# Patient Record
Sex: FEMALE | Race: WHITE | NOT HISPANIC OR LATINO | Employment: OTHER | URBAN - METROPOLITAN AREA
[De-identification: names, ages, dates, MRNs, and addresses within clinical notes are randomized per-mention and may not be internally consistent; named-entity substitution may affect disease eponyms.]

---

## 2017-01-19 ENCOUNTER — OFFICE VISIT (OUTPATIENT)
Dept: NEUROLOGY | Facility: CLINIC | Age: 22
End: 2017-01-19

## 2017-01-19 VITALS
DIASTOLIC BLOOD PRESSURE: 78 MMHG | OXYGEN SATURATION: 96 % | SYSTOLIC BLOOD PRESSURE: 110 MMHG | HEIGHT: 63 IN | HEART RATE: 116 BPM

## 2017-01-19 DIAGNOSIS — R90.89 ABNORMAL FINDING ON MRI OF BRAIN: ICD-10-CM

## 2017-01-19 DIAGNOSIS — Z87.74 PERSONAL HISTORY OF ARTERIOVENOUS MALFORMATION (AVM): ICD-10-CM

## 2017-01-19 DIAGNOSIS — G43.009 MIGRAINE WITHOUT AURA AND RESPONSIVE TO TREATMENT: Primary | ICD-10-CM

## 2017-01-19 DIAGNOSIS — G81.90 HEMIPARESIS (HCC): ICD-10-CM

## 2017-01-19 DIAGNOSIS — Z86.73 HISTORY OF CVA (CEREBROVASCULAR ACCIDENT): ICD-10-CM

## 2017-01-19 PROCEDURE — 99213 OFFICE O/P EST LOW 20 MIN: CPT | Performed by: NURSE PRACTITIONER

## 2017-01-19 RX ORDER — HYDROXYZINE HYDROCHLORIDE 25 MG/1
25 TABLET, FILM COATED ORAL NIGHTLY
COMMUNITY

## 2017-01-19 NOTE — Clinical Note
January 19, 2017     Amadou Mason MD  [address]  [place]  [facility] [address]    Patient: Bonita Yang   YOB: 1995   Date of Visit: 1/19/2017       Dear Dr. Marlon MD:    Thank you for referring Bonita Yang to me for evaluation. Below are the relevant portions of my assessment and plan of care.       Bonita was seen today for difficulty walking and migraine.    Diagnoses and all orders for this visit:    Migraine without aura and responsive to treatment    Hemiparesis  -     MRI Brain With & Without Contrast  -     MRI Angiogram Head Without Contrast    Personal history of arteriovenous malformation (AVM)  -     MRI Brain With & Without Contrast  -     MRI Angiogram Head Without Contrast    History of CVA (cerebrovascular accident)  -     MRI Brain With & Without Contrast  -     MRI Angiogram Head Without Contrast    Abnormal finding on MRI of brain  -     MRI Brain With & Without Contrast  -     MRI Angiogram Head Without Contrast               If you have questions, please do not hesitate to call me. I look forward to following Bonita along with you.         Sincerely,        NOHEMY Hein        CC: No Recipients

## 2017-01-19 NOTE — PROGRESS NOTES
"Subjective:     Patient ID: Bonita Yang is a 21 y.o. female.    History of Present Illness   Here for 6 month follow up on migraines. Since last visit she weaned off of Topamax and she has not had any migraines since then. She has 2-3 very minor tension headaches per week but rarely takes Ibuprofen for these. She is on Propranolol 20mg TID for headache prevention, anxiety and HTN and has tolerated this well. She is accompanied by her nurse today from [facility] where she lives and she reports she will be moving to [place], [place] in a few weeks to a new facility. She is due for repeat MRI Brain with and without contrast and MRA brain without contrast for annual screening per Neurology Dr. Wiseman's recommendations with her significant history of CVA and AVM. She is doing really well overall. Prefers to be called \"Bridgett\".    Significant history: had MRI brain with and without contrast which shows old right frontal and right temporal lobe infarct. no abnormal enhancement. MRA brain showed mild narrowing of m1-m2 portions of right MCA; both were done on 8/19/15. She has a history of AVM diagnosed at age 4. She did have Right Thalamic Stroke secondary to AVM September 17, 2012 and has left hemiparesis and left sided neglect since then. 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 She was diagnosed with AVM in the past and they did embolization and then removal in 2013. 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  The following portions of the patient's history were reviewed and updated as appropriate: allergies, current medications, past family history, past medical history, past social history, past surgical history and problem list.    Review of Systems "   Constitutional: Positive for fatigue. Negative for chills, fever and unexpected weight change.   HENT: Negative for ear pain, hearing loss, nosebleeds, rhinorrhea and sore throat.    Eyes: Positive for visual disturbance. Negative for photophobia, pain, discharge and itching.   Respiratory: Positive for cough. Negative for chest tightness, shortness of breath and wheezing.    Cardiovascular: Negative for chest pain, palpitations and leg swelling.        HEART RATE IS FAST   Gastrointestinal: Positive for constipation. Negative for abdominal pain, blood in stool, diarrhea, nausea and vomiting.   Endocrine: Positive for heat intolerance.        MUSCLE WEAKNESS   Genitourinary: Negative for dysuria, frequency, hematuria and urgency.   Musculoskeletal: Negative for arthralgias, back pain, gait problem, joint swelling, myalgias, neck pain and neck stiffness.        JOINT STIFFNESS   Skin: Positive for rash. Negative for wound.   Allergic/Immunologic: Negative for environmental allergies and food allergies.   Neurological: Positive for weakness (LIMB). Negative for dizziness, tremors, seizures, syncope, speech difficulty, light-headedness, numbness and headaches.        DIFFICULTY WALKING   Hematological: Negative for adenopathy. Does not bruise/bleed easily.   Psychiatric/Behavioral: Positive for dysphoric mood. Negative for agitation, confusion, decreased concentration, hallucinations, sleep disturbance and suicidal ideas. The patient is nervous/anxious.         Objective:    Neurologic Exam   Mental Status   Oriented to person, place, and time.   Attention: normal. Concentration: normal.   Speech: speech is normal   Level of consciousness: alert     Cranial Nerves      CN II   Visual fields full to confrontation.      CN III, IV, VI   Extraocular motions are normal.   Pupils: unequal  Right pupil: Size: 4 mm. Shape: regular. Reactivity: brisk.   Left pupil: Size: 6 mm. Shape: regular. Reactivity: brisk.      CN V    Facial sensation intact.      CN VII   Right facial weakness: none  Left facial weakness: central     CN VIII   CN VIII normal.      CN IX, X   CN IX normal.   CN X normal.      CN XI   Right sternocleidomastoid strength: normal  Left sternocleidomastoid strength: weak  Right trapezius strength: normal  Left trapezius strength: weak     CN XII   CN XII normal.      Motor Exam   Muscle bulk: decreased (LUE/LLE)  Right arm tone: normal  Left arm tone: spastic  Right leg tone: normal  Left leg tone: decreased     Strength   Strength 5/5 except as noted.   Left deltoid: 2/5  Left biceps: 2/5  Left triceps: 2/5  Left wrist flexion: 1/5  Left wrist extension: 1/5  Left quadriceps: 0/5  Left hamstrin/5  Left anterior tibial: 0/5  Left posterior tibial: 0/5  Left Hemiparesis      Sensory Exam   Light touch normal.   Vibration normal.   Proprioception normal.   Pinprick normal.      Gait, Coordination, and Reflexes      Gait  Gait: (wheelchair)     Coordination   Romberg: negative  Finger to nose coordination: abnormal (on Left)     Tremor   Resting tremor: absent  Intention tremor: absent  Action tremor: absent     Reflexes   Right brachioradialis: 4+  Left brachioradialis: 4+  Right biceps: 4+  Left biceps: 4+  Right triceps: 4+  Left triceps: 4+  Right patellar: 4+  Left patellar: 4+  Right achilles: 4+  Left achilles: 4+  Right : 2+  Left : 0  Right plantar: normal  Left plantar: normal  Right Redman: absent  Left Redman: absent  Right ankle clonus: absent  Left ankle clonus: absent     Physical Exam  Constitutional: She is oriented to person, place, and time.   Eyes: EOM are normal. Pupils are unequal.   Neurological: She is oriented to person, place, and time. She has an abnormal Finger-Nose-Finger Test (on Left). She has a normal Romberg Test.   Reflex Scores:  Tricep reflexes are 4+ on the right side and 4+ on the left side.  Bicep reflexes are 4+ on the right side and 4+ on the left  side.  Brachioradialis reflexes are 4+ on the right side and 4+ on the left side.  Patellar reflexes are 4+ on the right side and 4+ on the left side.  Achilles reflexes are 4+ on the right side and 4+ on the left side.  Psychiatric: Her speech is normal. Thought content normal. Her affect is blunt. She is hyperactive. She expresses impulsivity. She exhibits abnormal recent memory.   Assessment/Plan:     Bonita was seen today for difficulty walking and migraine.    Diagnoses and all orders for this visit:    Migraine without aura and responsive to treatment    Hemiparesis  -     MRI Brain With & Without Contrast  -     MRI Angiogram Head Without Contrast    Personal history of arteriovenous malformation (AVM)  -     MRI Brain With & Without Contrast  -     MRI Angiogram Head Without Contrast    History of CVA (cerebrovascular accident)  -     MRI Brain With & Without Contrast  -     MRI Angiogram Head Without Contrast    Abnormal finding on MRI of brain  -     MRI Brain With & Without Contrast  -     MRI Angiogram Head Without Contrast         Repeat MRI brain and MRA brain. Continue current medications. Headaches and migraines significantly improved. She will f/u in 6 months and then annually thereafter with annual MRI/MRAs. Reviewed medications, potential side effects and signs and symptoms to report. Discussed risk versus benefits of treatment plan with patient and/or family-including medications, labs and radiology that may be ordered. Addressed questions and concerns during visit. Patient and/or family verbalized understanding and agree with plan.

## 2017-01-19 NOTE — MR AVS SNAPSHOT
Bonita MOROCHO Celia   1/19/2017 2:30 PM   Office Visit    Dept Phone:  [phone]   Encounter #:  [id number]    Provider:  NOHEMY Hein   Department:  [facility] NEUROLOGY                Your Full Care Plan              Today's Medication Changes          These changes are accurate as of: 1/19/17  3:32 PM.  If you have any questions, ask your nurse or doctor.               Stop taking medication(s)listed here:     topiramate 25 MG tablet   Commonly known as:  TOPAMAX   Stopped by:  NOHEMY Hein                      Your Updated Medication List          This list is accurate as of: 1/19/17  3:32 PM.  Always use your most recent med list.                clotrimazole 1 % cream   Commonly known as:  LOTRIMIN   Apply  topically 2 (Two) Times a Day. Apply to affected areas BID x 7-14 days       diphenhydrAMINE 25 mg capsule   Commonly known as:  BENADRYL   TAKE 1 CAP EVERY 4 HOURS AS NEEDED CONGESTION/COLD SYMPTOMS/RUNNY NOSE/SNEEZINING/ITCHING       fexofenadine 180 MG tablet   Commonly known as:  ALLEGRA   TAKE ONE (1) TABLET BY MOUTH ONCE DAILY       HM ANTACID/ANTIGAS 200-200-20 MG/5ML suspension   Generic drug:  aluminum-magnesium hydroxide-simethicone   TAKE 2 TABLESPOONSFUL ONE HOUR AFTER MEALS AND AT BEDTIME AS NEEDED FOR INDIGESTION/HEARTBURN       hydrocortisone 0.5 % cream       hydrOXYzine 25 MG tablet   Commonly known as:  ATARAX       ibuprofen 800 MG tablet   Commonly known as:  ADVIL,MOTRIN   TAKE ONE (1) TABLET BY MOUTH EVERY 6 HOURS AS NEEDED FOR PAIN       loperamide 2 MG capsule   Commonly known as:  IMODIUM       magnesium hydroxide 400 MG/5ML suspension   Commonly known as:  MILK OF MAGNESIA       MAPAP 500 MG tablet   Generic drug:  acetaminophen   TAKE ONE (1) TABLET BY MOUTH EVERY 4 HOURS AS NEEDED FOR PAIN       NAPHCON-A 0.025-0.3 % ophthalmic solution   Generic drug:  naphazoline-pheniramine   PLACE ONE (1) DROP TO AFFECTED EYE  THREE TIMES DAILY FOR ALLERGIES       NEOSPORIN ORIGINAL 3.5-400-5000 ointment       ondansetron ODT 8 MG disintegrating tablet   Commonly known as:  ZOFRAN-ODT       oxyCODONE-acetaminophen 5-325 MG per tablet   Commonly known as:  PERCOCET   Take 1 tablet by mouth every 6 (six) hours as needed for moderate pain (4-6).       PARoxetine 30 MG tablet   Commonly known as:  PAXIL       PEPTO-BISMOL 262 MG/15ML suspension   Generic drug:  bismuth subsalicylate       phenol 1.4 % liquid liquid   Commonly known as:  CHLORASEPTIC       Polyethylene Glycol 3350 granules       promethazine 25 MG tablet   Commonly known as:  PHENERGAN       propranolol 20 MG tablet   Commonly known as:  INDERAL       ROBITUSSIN CHEST CONGESTION 100 MG/5ML syrup   Generic drug:  guaiFENesin       traZODone 50 MG tablet   Commonly known as:  DESYREL   TAKE ONE (1) TABLET BY MOUTH AT BEDTIME       VYVANSE 40 MG capsule   Generic drug:  lisdexamfetamine       warfarin 5 MG tablet   Commonly known as:  COUMADIN       ZYRTEC ALLERGY 10 MG capsule   Generic drug:  Cetirizine HCl               We Performed the Following     MRI Angiogram Head Without Contrast     MRI Brain With & Without Contrast       You Were Diagnosed With        Codes Comments    Migraine without aura and responsive to treatment    -  Primary ICD-10-CM: G43.009  ICD-9-CM: 346.10     Hemiparesis     ICD-10-CM: G81.90  ICD-9-CM: 342.90     Personal history of arteriovenous malformation (AVM)     ICD-10-CM: Z87.74  ICD-9-CM: V12.59     History of CVA (cerebrovascular accident)     ICD-10-CM: Z86.73  ICD-9-CM: V12.54     Abnormal finding on MRI of brain     ICD-10-CM: R90.89  ICD-9-CM: 793.0       Instructions     None    Patient Instructions History      Upcoming Appointments     Visit Type Date Time Department    FOLLOW UP 1/19/2017  2:30 PM [facility]    FOLLOW UP 7/19/2017 11:00 AM [facility]      MyChart Signup     Our records indicate that you have  "declined [facility] signup. If you would like to sign up for [employer], please email [email or web address] or call [phone] to obtain an activation code.             Other Info from Your Visit           Your Appointments     Jul 19, 2017 11:00 AM EDT   Follow Up with NOHEMY Hein   [facility] (--)    [address] [address] [phone]           Arrive 15 minutes prior to appointment.              Allergies     Morphine And Related        Reason for Visit     Difficulty Walking     Migraine           Vital Signs     Blood Pressure Pulse Height Oxygen Saturation Smoking Status       110/78 116 63\" (160 cm) 96% Current Every Day Smoker       Problems and Diagnoses Noted     Paralysis one side of body    History of CVA (cerebrovascular accident)    Migraine without aura and responsive to treatment    Personal history of arteriovenous malformation (AVM)    Abnormal brain MRI            "

## 2017-01-19 NOTE — LETTER
"January 19, 2017     Amadou Mason MD  [address]  [place]  [facility] [address]    Patient: Bonita Yang   YOB: 1995   Date of Visit: 1/19/2017       Dear Dr. Marlon MD:    Bonita Yang was in my office today. Below is a copy of my note.    If you have questions, please do not hesitate to call me. I look forward to following Bonita along with you.         Sincerely,        NOHEMY Hein        CC: No Recipients    Subjective:     Patient ID: Bonita Yang is a 21 y.o. female.    History of Present Illness   Here for 6 month follow up on migraines. Since last visit she weaned off of Topamax and she has not had any migraines since then. She has 2-3 very minor tension headaches per week but rarely takes Ibuprofen for these. She is on Propranolol 20mg TID for headache prevention, anxiety and HTN and has tolerated this well. She is accompanied by her nurse today from [facility] where she lives and she reports she will be moving to [place], [place] in a few weeks to a new facility. She is due for repeat MRI Brain with and without contrast and MRA brain without contrast for annual screening per Neurology Dr. Wiseman's recommendations with her significant history of CVA and AVM. She is doing really well overall. Prefers to be called \"Bridgett\".    Significant history: had MRI brain with and without contrast which shows old right frontal and right temporal lobe infarct. no abnormal enhancement. MRA brain showed mild narrowing of m1-m2 portions of right MCA; both were done on 8/19/15. She has a history of AVM diagnosed at age 4. She did have Right Thalamic Stroke secondary to AVM September 17, 2012 and has left hemiparesis and left sided neglect since then. 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 She was diagnosed with AVM in the past and they did embolization and then " "removal in 2013. 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  The following portions of the patient's history were reviewed and updated as appropriate: allergies, current medications, past family history, past medical history, past social history, past surgical history and problem list.    Review of Systems   Constitutional: Positive for fatigue. Negative for chills, fever and unexpected weight change.   HENT: Negative for ear pain, hearing loss, nosebleeds, rhinorrhea and sore throat.    Eyes: Positive for visual disturbance. Negative for photophobia, pain, discharge and itching.   Respiratory: Positive for cough. Negative for chest tightness, shortness of breath and wheezing.    Cardiovascular: Negative for chest pain, palpitations and leg swelling.        HEART RATE IS FAST   Gastrointestinal: Positive for constipation. Negative for abdominal pain, blood in stool, diarrhea, nausea and vomiting.   Endocrine: Positive for heat intolerance.        MUSCLE WEAKNESS   Genitourinary: Negative for dysuria, frequency, hematuria and urgency.   Musculoskeletal: Negative for arthralgias, back pain, gait problem, joint swelling, myalgias, neck pain and neck stiffness.        JOINT STIFFNESS   Skin: Positive for rash. Negative for wound.   Allergic/Immunologic: Negative for environmental allergies and food allergies.   Neurological: Positive for weakness (LIMB). Negative for dizziness, tremors, seizures, syncope, speech difficulty, light-headedness, numbness and headaches.        DIFFICULTY WALKING   Hematological: Negative for adenopathy. Does not bruise/bleed easily.   Psychiatric/Behavioral: Positive for dysphoric mood. Negative for agitation, confusion, decreased concentration, hallucinations, sleep disturbance and suicidal ideas. The patient is nervous/anxious.     "     Objective:    Neurologic Exam   Mental Status   Oriented to person, place, and time.   Attention: normal. Concentration: normal.   Speech: speech is normal   Level of consciousness: alert     Cranial Nerves      CN II   Visual fields full to confrontation.      CN III, IV, VI   Extraocular motions are normal.   Pupils: unequal  Right pupil: Size: 4 mm. Shape: regular. Reactivity: brisk.   Left pupil: Size: 6 mm. Shape: regular. Reactivity: brisk.      CN V   Facial sensation intact.      CN VII   Right facial weakness: none  Left facial weakness: central     CN VIII   CN VIII normal.      CN IX, X   CN IX normal.   CN X normal.      CN XI   Right sternocleidomastoid strength: normal  Left sternocleidomastoid strength: weak  Right trapezius strength: normal  Left trapezius strength: weak     CN XII   CN XII normal.      Motor Exam   Muscle bulk: decreased (LUE/LLE)  Right arm tone: normal  Left arm tone: spastic  Right leg tone: normal  Left leg tone: decreased     Strength   Strength 5/5 except as noted.   Left deltoid: 2/5  Left biceps: 2/5  Left triceps: 2/5  Left wrist flexion: 1/5  Left wrist extension: 1/5  Left quadriceps: 0/5  Left hamstrin/5  Left anterior tibial: 0/5  Left posterior tibial: 0/5  Left Hemiparesis      Sensory Exam   Light touch normal.   Vibration normal.   Proprioception normal.   Pinprick normal.      Gait, Coordination, and Reflexes      Gait  Gait: (wheelchair)     Coordination   Romberg: negative  Finger to nose coordination: abnormal (on Left)     Tremor   Resting tremor: absent  Intention tremor: absent  Action tremor: absent     Reflexes   Right brachioradialis: 4+  Left brachioradialis: 4+  Right biceps: 4+  Left biceps: 4+  Right triceps: 4+  Left triceps: 4+  Right patellar: 4+  Left patellar: 4+  Right achilles: 4+  Left achilles: 4+  Right : 2+  Left : 0  Right plantar: normal  Left plantar: normal  Right Redman: absent  Left Redman: absent  Right ankle clonus:  absent  Left ankle clonus: absent     Physical Exam  Constitutional: She is oriented to person, place, and time.   Eyes: EOM are normal. Pupils are unequal.   Neurological: She is oriented to person, place, and time. She has an abnormal Finger-Nose-Finger Test (on Left). She has a normal Romberg Test.   Reflex Scores:  Tricep reflexes are 4+ on the right side and 4+ on the left side.  Bicep reflexes are 4+ on the right side and 4+ on the left side.  Brachioradialis reflexes are 4+ on the right side and 4+ on the left side.  Patellar reflexes are 4+ on the right side and 4+ on the left side.  Achilles reflexes are 4+ on the right side and 4+ on the left side.  Psychiatric: Her speech is normal. Thought content normal. Her affect is blunt. She is hyperactive. She expresses impulsivity. She exhibits abnormal recent memory.   Assessment/Plan:     Bonita was seen today for difficulty walking and migraine.    Diagnoses and all orders for this visit:    Migraine without aura and responsive to treatment    Hemiparesis  -     MRI Brain With & Without Contrast  -     MRI Angiogram Head Without Contrast    Personal history of arteriovenous malformation (AVM)  -     MRI Brain With & Without Contrast  -     MRI Angiogram Head Without Contrast    History of CVA (cerebrovascular accident)  -     MRI Brain With & Without Contrast  -     MRI Angiogram Head Without Contrast    Abnormal finding on MRI of brain  -     MRI Brain With & Without Contrast  -     MRI Angiogram Head Without Contrast         Repeat MRI brain and MRA brain. Continue current medications. Headaches and migraines significantly improved. She will f/u in 6 months and then annually thereafter with annual MRI/MRAs. Reviewed medications, potential side effects and signs and symptoms to report. Discussed risk versus benefits of treatment plan with patient and/or family-including medications, labs and radiology that may be ordered. Addressed questions and concerns  during visit. Patient and/or family verbalized understanding and agree with plan.

## 2017-01-30 ENCOUNTER — HOSPITAL ENCOUNTER (OUTPATIENT)
Dept: MRI IMAGING | Facility: HOSPITAL | Age: 22
Discharge: HOME | End: 2017-01-30
Admitting: NURSE PRACTITIONER

## 2017-01-30 ENCOUNTER — HOSPITAL ENCOUNTER (OUTPATIENT)
Dept: MRI IMAGING | Facility: HOSPITAL | Age: 22
Discharge: HOME | End: 2017-01-30

## 2017-01-30 PROCEDURE — 70544 MR ANGIOGRAPHY HEAD W/O DYE: CPT

## 2017-01-30 PROCEDURE — A9577 INJ MULTIHANCE: HCPCS | Performed by: NURSE PRACTITIONER

## 2017-01-30 PROCEDURE — 0 GADOBENATE DIMEGLUMINE 529 MG/ML SOLUTION: Performed by: NURSE PRACTITIONER

## 2017-01-30 PROCEDURE — 70553 MRI BRAIN STEM W/O & W/DYE: CPT

## 2017-01-30 RX ADMIN — GADOBENATE DIMEGLUMINE 20 ML: 529 INJECTION, SOLUTION INTRAVENOUS at 17:30

## 2017-02-01 ENCOUNTER — TELEPHONE (OUTPATIENT)
Dept: NEUROLOGY | Facility: CLINIC | Age: 22
End: 2017-02-01

## 2017-02-01 NOTE — TELEPHONE ENCOUNTER
Please notify patient and her facility mri brain and mra brain are stable compared to prior images. thanks

## 2017-02-02 NOTE — TELEPHONE ENCOUNTER
CALLED FACILITY, SPOKE WITH ARLENE, GAVE HER THE RESULTS.  REQUESTED A COPY TO BE FAXED [phone]. FAXED REPORT TO GIVEN NUMBER.

## 2018-11-09 ENCOUNTER — OFFICE VISIT CONVERTED (OUTPATIENT)
Dept: PLASTIC SURGERY | Facility: CLINIC | Age: 23
End: 2018-11-09
Attending: PLASTIC SURGERY

## 2021-05-11 NOTE — H&P
"   History and Physical      Patient Name: Bonita Yang   Patient ID: [id number]   Sex: Female   YOB: 1995    Referring Provider: Lanette Rodriguez DO    Visit Date: 2018    Provider: Kaye Berrios MD   Location: [facility]   Location Address: [address] [place] [address]  [id number]   Location Phone: [phone]          Chief Complaint  · \"My breasts are too big\"  · \"I'm having shoulder and back pain\"  · \"My bra straps are cutting into my shoulders\"  · \"I have rashes under my breasts\"      History Of Present Illness  Bonita Yang is a 23 year old /White female who presents to the office today as a consult from Lanette Rodriguez DO.      Here today to discuss large breasts. Currently wears a 44F but it is too small. Would like to be a C cup. No personal or family history of breast cancer. Upper back pain, goes to chiropractor with little relief. Rashes under breasts. Has tried to loss weight but that does not seem to have helped. \"What about making them perkier?\"    Stroke in 2012, while 5 months pregnant. Smokes less than 1 ppd. Has left sided paralysis requiring walker. Current brain AVM.           Past Medical History  Anxiety; Arteriovenous Malformation; Blood clot in vein; Chemical dependency; Head injury; Macromastia; Major depressive disorder; Migraine; Psychiatric Care; Seasonal allergies; Seizure; Stroke         Past Surgical History  Brain Surgery;  section; Gastrostomy feeding tube placement; Teeth extraction; Tonsilectomy         Medication List  Antifungal (clotrimazole) 1 % topical cream; diphenhydramine HCl 25 mg oral capsule;  mg oral capsule; duloxetine 60 mg oral capsule,delayed release(DR/EC); hydrocortisone 1 % topical cream; levocetirizine 5 mg oral tablet; loperamide 2 mg oral capsule; Mapap (acetaminophen) 500 mg oral capsule; meloxicam 7.5 mg oral tablet; methocarbamol 500 mg oral tablet; " "methylphenidate HCl 20 mg oral capsule, ER biphasic 30-70; Mi-Acid 700-300 mg oral tablet,chewable; montelukast 10 mg oral tablet; nystatin 100,000 unit/gram topical powder; omeprazole 20 mg oral capsule,delayed release(DR/EC); ondansetron 8 mg oral tablet,disintegrating; Opcon-A 0.98496-3.315 % ophthalmic (eye) drops; oxybutynin chloride 5 mg oral tablet; Prenatal 28 mg iron- 800 mcg oral tablet; rizatriptan 10 mg oral tablet; Triple Antibiotic 3.5mg-400 unit- 5,000 unit/gram topical ointment; Vitamin D3 2,000 unit oral capsule         Allergy List  morphine         Family Medical History  Diabetes; Heart Disease; Stroke         Social History  Tobacco (Current every day)         Review of Systems  · Cardiovascular  o Denies  o : chest pain, lower extremity edema, Heart Attack, abnormal EKG  · Respiratory  o Denies  o : shortness of breath, wheezing, cough  · Neurologic  o Admits  o : muscular weakness, incoordination, tingling or numbness, headaches  · Psychiatric  o Admits  o : anxiety, depression      Vitals  Date Time BP Position Site L\R Cuff Size HR RR TEMP(F) WT  HT  BMI kg/m2 BSA m2 O2 Sat        11/09/2018 10:31 /83 Sitting    105 - R   236lbs 2oz 5'  3\" 41.83 2.18 96 %          Physical Examination  · Constitutional  o Appearance  o : well developed, well-nourished, Alert and oriented X3  · Eyes  o Sclerae  o : sclerae white  · Respiratory  o Respiratory Effort  o : breathing unlabored  · Breasts  o FEMALE BREAST EXAM  o :   § Masses  § : no masses, redness present on exam bilaterally.   § Nipple Discharge  § : no nipple discharge  § Breast symmetry  § : right breast is larger than left breast.   § Axillary Lymphadenopathy  § : no axillary lymphadenopathy  § SN-N (Right Breast)  § : 37  § SN-N (Left Breast)  § : 35  § SN-IMF (Right Breast)  § : 27  § SN-IMF (Left Breast)  § : 27  § N-IMF stretch (Right Breast)  § : 17  § N-IMF stretch (Left Breast)  § : 17  · Gastrointestinal  o Abdominal " Examination  o : abdomen nontender to palpation  · Lymphatic  o Axilla  o : No lymphadenopathy present  · Skin and Subcutaneous Tissue  o General Inspection  o : no lesions present, no areas of discoloration, skin turgor normal, texture normal  · Psychiatric  o Judgement and Insight  o : judgment and insight intact  o Mood and Affect  o : mood normal, affect appropriate  · Schnur Scale  o Schnur Scale Score  o : 819  · BSA  o BSA  o : 2.18          Assessment  · Tobacco abuse     305.1/Z72.0  · Macromastia     611.1/N62      Plan  · Orders  o Smoking cessation counseling, 3-10 minutes [facility] ([id number]) - 305.1/Z72.0 - 11/09/2018  o Plastics reconstructive visit ([facility]) - - 11/09/2018  o BMI above 25 and plan documented () - - 11/09/2018  · Instructions  o Tobacco and smoking cessation counseling for more than 3 minutes was completed.  o Due to the patient's significant medical comorbidities I think the risks outweigh the benefits of surgery. Therefore I do not think the patient is a candidate for surgical breast reduction. We discussed conservative treatment such as weight loss and strengthening her upper back. The patient understands and is amenable to this plan.  o Greater than 45 min of time was spent directly with the pt in counseling & coordination of care.            Electronically Signed by: Kaye Berrios MD -Author on November 9, 2018 11:18:08 AM

## 2021-05-16 VITALS
BODY MASS INDEX: 41.84 KG/M2 | OXYGEN SATURATION: 96 % | HEART RATE: 105 BPM | SYSTOLIC BLOOD PRESSURE: 122 MMHG | DIASTOLIC BLOOD PRESSURE: 83 MMHG | WEIGHT: 236.12 LBS | HEIGHT: 63 IN